# Patient Record
Sex: FEMALE | Race: WHITE | Employment: OTHER | URBAN - NONMETROPOLITAN AREA
[De-identification: names, ages, dates, MRNs, and addresses within clinical notes are randomized per-mention and may not be internally consistent; named-entity substitution may affect disease eponyms.]

---

## 2019-01-10 ENCOUNTER — OFFICE VISIT (OUTPATIENT)
Dept: FAMILY MEDICINE CLINIC | Age: 79
End: 2019-01-10
Payer: MEDICARE

## 2019-01-10 VITALS
BODY MASS INDEX: 20.19 KG/M2 | HEART RATE: 73 BPM | WEIGHT: 114 LBS | DIASTOLIC BLOOD PRESSURE: 80 MMHG | SYSTOLIC BLOOD PRESSURE: 130 MMHG | OXYGEN SATURATION: 94 %

## 2019-01-10 DIAGNOSIS — F41.9 ANXIETY: ICD-10-CM

## 2019-01-10 DIAGNOSIS — R53.83 FATIGUE, UNSPECIFIED TYPE: ICD-10-CM

## 2019-01-10 DIAGNOSIS — K21.9 GASTROESOPHAGEAL REFLUX DISEASE, ESOPHAGITIS PRESENCE NOT SPECIFIED: ICD-10-CM

## 2019-01-10 DIAGNOSIS — E78.5 HYPERLIPIDEMIA, UNSPECIFIED HYPERLIPIDEMIA TYPE: Primary | ICD-10-CM

## 2019-01-10 DIAGNOSIS — G62.9 NEUROPATHY: ICD-10-CM

## 2019-01-10 DIAGNOSIS — E55.9 VITAMIN D DEFICIENCY: ICD-10-CM

## 2019-01-10 PROCEDURE — G8420 CALC BMI NORM PARAMETERS: HCPCS | Performed by: FAMILY MEDICINE

## 2019-01-10 PROCEDURE — G8427 DOCREV CUR MEDS BY ELIG CLIN: HCPCS | Performed by: FAMILY MEDICINE

## 2019-01-10 PROCEDURE — 4040F PNEUMOC VAC/ADMIN/RCVD: CPT | Performed by: FAMILY MEDICINE

## 2019-01-10 PROCEDURE — 1123F ACP DISCUSS/DSCN MKR DOCD: CPT | Performed by: FAMILY MEDICINE

## 2019-01-10 PROCEDURE — 1090F PRES/ABSN URINE INCON ASSESS: CPT | Performed by: FAMILY MEDICINE

## 2019-01-10 PROCEDURE — 99203 OFFICE O/P NEW LOW 30 MIN: CPT | Performed by: FAMILY MEDICINE

## 2019-01-10 PROCEDURE — G8484 FLU IMMUNIZE NO ADMIN: HCPCS | Performed by: FAMILY MEDICINE

## 2019-01-10 PROCEDURE — G8400 PT W/DXA NO RESULTS DOC: HCPCS | Performed by: FAMILY MEDICINE

## 2019-01-10 PROCEDURE — 1036F TOBACCO NON-USER: CPT | Performed by: FAMILY MEDICINE

## 2019-01-10 PROCEDURE — 1101F PT FALLS ASSESS-DOCD LE1/YR: CPT | Performed by: FAMILY MEDICINE

## 2019-01-10 ASSESSMENT — PATIENT HEALTH QUESTIONNAIRE - PHQ9
2. FEELING DOWN, DEPRESSED OR HOPELESS: 0
SUM OF ALL RESPONSES TO PHQ QUESTIONS 1-9: 0
SUM OF ALL RESPONSES TO PHQ QUESTIONS 1-9: 0
SUM OF ALL RESPONSES TO PHQ9 QUESTIONS 1 & 2: 0
1. LITTLE INTEREST OR PLEASURE IN DOING THINGS: 0

## 2019-01-14 LAB
A/G RATIO: 1.5 RATIO
AGE FOR GFR: 79
ALBUMIN: 4.1 G/DL
ALK PHOSPHATASE: 84 UNITS/L
ALT SERPL-CCNC: 14 UNITS/L
ANION GAP SERPL CALCULATED.3IONS-SCNC: 9 MMOL/L
AST SERPL-CCNC: 24 UNITS/L
BASOPHILS # BLD: 0.07 THOU/MM3
BILIRUB SERPL-MCNC: 0.6 MG/DL
BUN BLDV-MCNC: 20 MG/DL
CALCIUM SERPL-MCNC: 9.6 MG/DL
CHLORIDE BLD-SCNC: 102 MMOL/L
CHOLESTEROL/HDL RATIO: 1.8 RATIO
CHOLESTEROL: 149 MG/DL
CO2: 33 MMOL/L
CREAT SERPL-MCNC: 0.6 MG/DL
DIFFERENTIAL: AUTOMATED DIFF
EGFR BF: 117 ML/MIN/1.73 M2
EGFR BM: 158 ML/MIN/1.73 M2
EGFR WF: 96 ML/MIN/1.73 M2
EGFR WM: 130 ML/MIN/1.73 M2
EOSINOPHIL # BLD: 0.09 THOU/MM3
FOLATE: >20 NG/ML
GLOBULIN: 2.8 G/DL
GLUCOSE: 86 MG/DL
HCT VFR BLD CALC: 40.9 %
HDL, DIRECT: 84 MG/DL
HEMOGLOBIN: 13.2 G/DL
LDL CHOLESTEROL CALCULATED: 52.4 MG/DL
LYMPHOCYTES # BLD: 1.6 THOU/MM3
MCH RBC QN AUTO: 28.4 PG
MCHC RBC AUTO-ENTMCNC: 32.2 G/DL
MCV RBC AUTO: 88.2 FL
MONOCYTES # BLD: 0.41 THOU/MM3
NEUTROPHILS: 2.72 THOU/MM3
PDW BLD-RTO: 12 %
PLATELET # BLD: 227 THOU/MM3
PMV BLD AUTO: 6.8 FL
POTASSIUM SERPL-SCNC: 4.1 MMOL/L
RBC # BLD: 4.64 M/UL
SODIUM BLD-SCNC: 140 MMOL/L
TOTAL PROTEIN: 6.9 G/DL
TRIGL SERPL-MCNC: 63 MG/DL
TSH REFLEX FT4: 4.08 MIU/ML
VITAMIN B-12: 490 PG/ML
VITAMIN D2, 25 HYDROXY: 25 NG/ML
VLDLC SERPL CALC-MCNC: 13 MG/DL
WBC # BLD: 4.89 THOU/ML3

## 2019-01-16 ASSESSMENT — ENCOUNTER SYMPTOMS
ABDOMINAL PAIN: 0
SINUS PRESSURE: 0
BACK PAIN: 0
SORE THROAT: 0
COUGH: 0
RHINORRHEA: 0
SINUS PAIN: 0
SHORTNESS OF BREATH: 0
CONSTIPATION: 0
WHEEZING: 0
DIARRHEA: 0
BLOOD IN STOOL: 0

## 2019-02-26 DIAGNOSIS — G62.9 NEUROPATHY: Primary | ICD-10-CM

## 2019-02-26 RX ORDER — TRAMADOL HYDROCHLORIDE 50 MG/1
50 TABLET ORAL 2 TIMES DAILY PRN
Qty: 60 TABLET | Refills: 0 | Status: SHIPPED | OUTPATIENT
Start: 2019-02-26 | End: 2019-03-28

## 2019-03-14 DIAGNOSIS — F41.9 ANXIETY: Primary | ICD-10-CM

## 2019-03-14 RX ORDER — ALPRAZOLAM 0.25 MG/1
TABLET ORAL
Qty: 60 TABLET | Refills: 5 | Status: SHIPPED | OUTPATIENT
Start: 2019-03-14 | End: 2019-09-14

## 2019-04-30 ENCOUNTER — OFFICE VISIT (OUTPATIENT)
Dept: FAMILY MEDICINE CLINIC | Age: 79
End: 2019-04-30
Payer: MEDICARE

## 2019-04-30 VITALS
WEIGHT: 117 LBS | HEART RATE: 72 BPM | BODY MASS INDEX: 20.73 KG/M2 | OXYGEN SATURATION: 97 % | DIASTOLIC BLOOD PRESSURE: 60 MMHG | SYSTOLIC BLOOD PRESSURE: 120 MMHG

## 2019-04-30 DIAGNOSIS — J06.9 VIRAL URI: Primary | ICD-10-CM

## 2019-04-30 DIAGNOSIS — M17.12 OSTEOARTHRITIS OF LEFT KNEE, UNSPECIFIED OSTEOARTHRITIS TYPE: ICD-10-CM

## 2019-04-30 PROCEDURE — 99214 OFFICE O/P EST MOD 30 MIN: CPT | Performed by: FAMILY MEDICINE

## 2019-04-30 PROCEDURE — G8420 CALC BMI NORM PARAMETERS: HCPCS | Performed by: FAMILY MEDICINE

## 2019-04-30 PROCEDURE — G8400 PT W/DXA NO RESULTS DOC: HCPCS | Performed by: FAMILY MEDICINE

## 2019-04-30 PROCEDURE — G8427 DOCREV CUR MEDS BY ELIG CLIN: HCPCS | Performed by: FAMILY MEDICINE

## 2019-04-30 PROCEDURE — 1123F ACP DISCUSS/DSCN MKR DOCD: CPT | Performed by: FAMILY MEDICINE

## 2019-04-30 PROCEDURE — 1036F TOBACCO NON-USER: CPT | Performed by: FAMILY MEDICINE

## 2019-04-30 PROCEDURE — 1090F PRES/ABSN URINE INCON ASSESS: CPT | Performed by: FAMILY MEDICINE

## 2019-04-30 PROCEDURE — 4040F PNEUMOC VAC/ADMIN/RCVD: CPT | Performed by: FAMILY MEDICINE

## 2019-04-30 RX ORDER — FLUTICASONE PROPIONATE 50 MCG
2 SPRAY, SUSPENSION (ML) NASAL DAILY
Qty: 1 BOTTLE | Refills: 0 | Status: SHIPPED | OUTPATIENT
Start: 2019-04-30 | End: 2019-07-11

## 2019-04-30 ASSESSMENT — ENCOUNTER SYMPTOMS
SINUS PRESSURE: 1
SORE THROAT: 0
WHEEZING: 0
RHINORRHEA: 1
BACK PAIN: 0
COUGH: 1
SHORTNESS OF BREATH: 0
SINUS PAIN: 1

## 2019-04-30 NOTE — PROGRESS NOTES
[address] [facility]  [address]  Dept: [phone]  DeptFax: [phone]    Rex Chairez is a65 y.o. female who presents today for her medical conditions/complaints as noted below. Rex Chairez is c/o of Sinusitis (pt c/o congestion, pressure in bridge of nose, coughing up mucous, one day she'll feel good next day she will feel awful) and Leg Swelling (c/o has a spot on left side of knee that swells up and is painful at times)      HPI:     HPI   Patient comes in thinking she has a sinus infection. Started for 5 days ago. She feels congested. He has sinus pressure. Yellow drainage. Coughs up yellow drainage. No fever. No shortness of breath or dyspnea on exertion. No sore throat. No ear pain. Also complaining of left knee pain. She's had for quite some time. Localizes it to the medial compartment. Pain is not there all the time. Sometimes it seems to be swollen. With the pain. No precipitating or alleviating factors. She will use some Tylenol. It hurts to walk when the pain is there. No prior trauma or injury          BP Readings from Last 3 Encounters:   04/30/19 120/60   01/10/19 130/80   08/02/13 130/80            (goal 120/80)    Past Medical History:   Diagnosis Date    Anxiety     Hyperlipidemia     Neuropathy       No past surgical history on file. No family history on file. Social History     Tobacco Use    Smoking status: Never Smoker    Smokeless tobacco: Never Used   Substance Use Topics    Alcohol use: No        Current Outpatient Medications   Medication Sig Dispense Refill    fluticasone (FLONASE) 50 MCG/ACT nasal spray 2 sprays by Each Nare route daily 2 Sprays in each nostril 1 Bottle 0    ALPRAZolam (XANAX) 0.25 MG tablet One or two nightly as needed 60 tablet 5    rosuvastatin (CRESTOR) 10 MG tablet Take 10 mg by mouth daily. No current facility-administered medications for this visit. Allergies   Allergen Reactions    Cetirizine Other (See Comments)    Ranitidine        Health Maintenance   Topic Date Due    Shingles Vaccine (1 of 2) 09/17/1989    DEXA (modify frequency per FRAX score)  09/17/2004    Pneumococcal 65+ years Vaccine (1 of 2 - PCV13) 09/17/2004    DTaP/Tdap/Td vaccine (3 - Td) 06/29/2022    Flu vaccine  Completed       Lab Results   Component Value Date    K 4.1 01/14/2019    CREATININE 0.6 01/14/2019    AST 24 01/14/2019    ALT 14 01/14/2019    HCT 40.9 01/14/2019    GLUCOSE 86 01/14/2019    CALCIUM 9.6 01/14/2019    GYLKZLZB90 490 01/14/2019      Lab Results   Component Value Date    CHOL 149 01/14/2019    TRIG 63 01/14/2019       Subjective:      Review of Systems   Constitutional: Negative for activity change, chills, diaphoresis, fatigue and fever. HENT: Positive for congestion, rhinorrhea, sinus pressure and sinus pain. Negative for ear discharge, ear pain and sore throat. Respiratory: Positive for cough. Negative for shortness of breath and wheezing. Cardiovascular: Negative for chest pain. Musculoskeletal: Positive for arthralgias, gait problem (due to her neuropathy ) and joint swelling. Negative for back pain. Objective:     /60   Pulse 72   Wt 117 lb (53.1 kg)   SpO2 97%   BMI 20.73 kg/m²     Physical Exam   Constitutional: No distress. HENT:   Right Ear: Tympanic membrane normal.   Left Ear: Tympanic membrane normal.   Nose: No mucosal edema or rhinorrhea. Right sinus exhibits no maxillary sinus tenderness and no frontal sinus tenderness. Left sinus exhibits no maxillary sinus tenderness and no frontal sinus tenderness. Mouth/Throat: Oropharynx is clear and moist and mucous membranes are normal. No tonsillar exudate. Cardiovascular: Normal rate, regular rhythm, S1 normal, S2 normal and normal heart sounds. No murmur heard. Pulmonary/Chest: Effort normal. She has no decreased breath sounds. She has no wheezes.  She has no rhonchi. She has no rales. Chest wall is not dull to percussion. Musculoskeletal:        Left knee: She exhibits normal range of motion, no swelling, no effusion, no erythema, normal patellar mobility, no bony tenderness, normal meniscus and no MCL laxity. Tenderness found. Medial joint line and MCL tenderness noted. Assessment:      Diagnosis Orders   1. Viral URI     2. Osteoarthritis of left knee, unspecified osteoarthritis type              POC Testing Results (If Applicable):  No results found for this visit on 19. Plan:     Flonase for nasal drainage. We will observe her left knee. This is most undoubtedly some arthritis. No x-ray. Call if no better in a week with her nasal drainage or if she develops fever. Otherwise she may return the office as scheduled sooner if any problems    Orders Given:  No orders of the defined types were placed in this encounter. Prescriptions:    Orders Placed This Encounter   Medications    fluticasone (FLONASE) 50 MCG/ACT nasal spray     Si sprays by Each Nare route daily 2 Sprays in each nostril     Dispense:  1 Bottle     Refill:  0        Return today (on 2019). Electronically signed by Wayne Velasquez MD on2019. **This report has been created using voice recognition software. It may contain minor errors which are inherent in voice recognition technology. **

## 2019-05-28 RX ORDER — ROSUVASTATIN CALCIUM 10 MG/1
10 TABLET, COATED ORAL DAILY
Qty: 90 TABLET | Refills: 0 | Status: SHIPPED | OUTPATIENT
Start: 2019-05-28 | End: 2019-10-30 | Stop reason: SDUPTHER

## 2019-05-31 ENCOUNTER — TELEPHONE (OUTPATIENT)
Dept: FAMILY MEDICINE CLINIC | Age: 79
End: 2019-05-31

## 2019-06-25 ENCOUNTER — TELEPHONE (OUTPATIENT)
Dept: FAMILY MEDICINE CLINIC | Age: 79
End: 2019-06-25

## 2019-06-25 DIAGNOSIS — Z12.31 ENCOUNTER FOR SCREENING MAMMOGRAM FOR MALIGNANT NEOPLASM OF BREAST: Primary | ICD-10-CM

## 2019-07-06 DIAGNOSIS — Z12.31 ENCOUNTER FOR SCREENING MAMMOGRAM FOR MALIGNANT NEOPLASM OF BREAST: ICD-10-CM

## 2019-07-11 ENCOUNTER — OFFICE VISIT (OUTPATIENT)
Dept: FAMILY MEDICINE CLINIC | Age: 79
End: 2019-07-11
Payer: MEDICARE

## 2019-07-11 VITALS
WEIGHT: 118 LBS | HEIGHT: 63 IN | HEART RATE: 76 BPM | DIASTOLIC BLOOD PRESSURE: 64 MMHG | BODY MASS INDEX: 20.91 KG/M2 | SYSTOLIC BLOOD PRESSURE: 126 MMHG | OXYGEN SATURATION: 95 %

## 2019-07-11 DIAGNOSIS — E78.5 HYPERLIPIDEMIA, UNSPECIFIED HYPERLIPIDEMIA TYPE: ICD-10-CM

## 2019-07-11 DIAGNOSIS — G62.9 NEUROPATHY: ICD-10-CM

## 2019-07-11 DIAGNOSIS — K21.9 GASTROESOPHAGEAL REFLUX DISEASE, ESOPHAGITIS PRESENCE NOT SPECIFIED: ICD-10-CM

## 2019-07-11 DIAGNOSIS — F41.9 ANXIETY: Primary | ICD-10-CM

## 2019-07-11 DIAGNOSIS — E55.9 VITAMIN D DEFICIENCY: ICD-10-CM

## 2019-07-11 PROCEDURE — G8427 DOCREV CUR MEDS BY ELIG CLIN: HCPCS | Performed by: FAMILY MEDICINE

## 2019-07-11 PROCEDURE — 99214 OFFICE O/P EST MOD 30 MIN: CPT | Performed by: FAMILY MEDICINE

## 2019-07-11 PROCEDURE — 4040F PNEUMOC VAC/ADMIN/RCVD: CPT | Performed by: FAMILY MEDICINE

## 2019-07-11 PROCEDURE — G8400 PT W/DXA NO RESULTS DOC: HCPCS | Performed by: FAMILY MEDICINE

## 2019-07-11 PROCEDURE — 1090F PRES/ABSN URINE INCON ASSESS: CPT | Performed by: FAMILY MEDICINE

## 2019-07-11 PROCEDURE — G8420 CALC BMI NORM PARAMETERS: HCPCS | Performed by: FAMILY MEDICINE

## 2019-07-11 PROCEDURE — 1123F ACP DISCUSS/DSCN MKR DOCD: CPT | Performed by: FAMILY MEDICINE

## 2019-07-11 PROCEDURE — 1036F TOBACCO NON-USER: CPT | Performed by: FAMILY MEDICINE

## 2019-07-11 ASSESSMENT — ENCOUNTER SYMPTOMS
ABDOMINAL PAIN: 0
SORE THROAT: 0
COUGH: 0
DIARRHEA: 0
SINUS PRESSURE: 0
SHORTNESS OF BREATH: 0
WHEEZING: 0
BLOOD IN STOOL: 0
SINUS PAIN: 0
BACK PAIN: 0
RHINORRHEA: 0
CONSTIPATION: 0

## 2019-10-30 DIAGNOSIS — E78.5 HYPERLIPIDEMIA, UNSPECIFIED HYPERLIPIDEMIA TYPE: ICD-10-CM

## 2019-10-30 DIAGNOSIS — F41.9 ANXIETY: Primary | ICD-10-CM

## 2019-10-31 RX ORDER — ROSUVASTATIN CALCIUM 10 MG/1
10 TABLET, COATED ORAL DAILY
Qty: 30 TABLET | Refills: 3 | Status: SHIPPED | OUTPATIENT
Start: 2019-10-31 | End: 2020-02-19

## 2019-10-31 RX ORDER — ROSUVASTATIN CALCIUM 10 MG/1
5 TABLET, COATED ORAL DAILY
Qty: 90 TABLET | Refills: 3 | Status: SHIPPED | OUTPATIENT
Start: 2019-10-31 | End: 2020-02-19

## 2019-10-31 RX ORDER — ALPRAZOLAM 0.5 MG/1
TABLET ORAL
Qty: 60 TABLET | Refills: 5 | Status: SHIPPED | OUTPATIENT
Start: 2019-10-31 | End: 2019-11-30

## 2019-10-31 RX ORDER — ALPRAZOLAM 0.5 MG/1
0.5 TABLET ORAL 2 TIMES DAILY PRN
Qty: 60 TABLET | Refills: 5 | Status: SHIPPED | OUTPATIENT
Start: 2019-10-31 | End: 2019-11-30

## 2019-11-07 DIAGNOSIS — F41.9 ANXIETY: Primary | ICD-10-CM

## 2019-11-07 DIAGNOSIS — E78.5 HYPERLIPIDEMIA, UNSPECIFIED HYPERLIPIDEMIA TYPE: ICD-10-CM

## 2019-11-08 RX ORDER — ROSUVASTATIN CALCIUM 5 MG/1
5 TABLET, COATED ORAL DAILY
Qty: 90 TABLET | Refills: 3 | Status: SHIPPED | OUTPATIENT
Start: 2019-11-08 | End: 2020-10-30 | Stop reason: SDUPTHER

## 2019-11-08 RX ORDER — ALPRAZOLAM 0.25 MG/1
0.25 TABLET ORAL NIGHTLY PRN
Qty: 30 TABLET | Refills: 5 | Status: SHIPPED | OUTPATIENT
Start: 2019-11-08 | End: 2019-12-08

## 2020-02-19 ENCOUNTER — OFFICE VISIT (OUTPATIENT)
Dept: FAMILY MEDICINE CLINIC | Age: 80
End: 2020-02-19
Payer: MEDICARE

## 2020-02-19 VITALS
SYSTOLIC BLOOD PRESSURE: 120 MMHG | OXYGEN SATURATION: 99 % | HEART RATE: 66 BPM | DIASTOLIC BLOOD PRESSURE: 70 MMHG | WEIGHT: 118 LBS | BODY MASS INDEX: 20.9 KG/M2

## 2020-02-19 PROBLEM — E55.9 VITAMIN D DEFICIENCY: Status: ACTIVE | Noted: 2020-02-19

## 2020-02-19 PROBLEM — G62.9 NEUROPATHY: Status: ACTIVE | Noted: 2020-02-19

## 2020-02-19 PROBLEM — E78.5 HYPERLIPIDEMIA: Status: ACTIVE | Noted: 2020-02-19

## 2020-02-19 PROBLEM — F41.9 ANXIETY: Status: ACTIVE | Noted: 2020-02-19

## 2020-02-19 PROBLEM — G47.00 INSOMNIA: Status: ACTIVE | Noted: 2020-02-19

## 2020-02-19 PROCEDURE — 99214 OFFICE O/P EST MOD 30 MIN: CPT | Performed by: FAMILY MEDICINE

## 2020-02-19 PROCEDURE — 1090F PRES/ABSN URINE INCON ASSESS: CPT | Performed by: FAMILY MEDICINE

## 2020-02-19 PROCEDURE — 1123F ACP DISCUSS/DSCN MKR DOCD: CPT | Performed by: FAMILY MEDICINE

## 2020-02-19 PROCEDURE — 1036F TOBACCO NON-USER: CPT | Performed by: FAMILY MEDICINE

## 2020-02-19 PROCEDURE — G8400 PT W/DXA NO RESULTS DOC: HCPCS | Performed by: FAMILY MEDICINE

## 2020-02-19 PROCEDURE — G8482 FLU IMMUNIZE ORDER/ADMIN: HCPCS | Performed by: FAMILY MEDICINE

## 2020-02-19 PROCEDURE — G8427 DOCREV CUR MEDS BY ELIG CLIN: HCPCS | Performed by: FAMILY MEDICINE

## 2020-02-19 PROCEDURE — G8420 CALC BMI NORM PARAMETERS: HCPCS | Performed by: FAMILY MEDICINE

## 2020-02-19 PROCEDURE — 4040F PNEUMOC VAC/ADMIN/RCVD: CPT | Performed by: FAMILY MEDICINE

## 2020-02-19 RX ORDER — ACETAMINOPHEN 325 MG/1
TABLET ORAL
Qty: 120 TABLET | Refills: 3
Start: 2020-02-19

## 2020-02-19 RX ORDER — ALPRAZOLAM 0.25 MG/1
0.25 TABLET ORAL 2 TIMES DAILY PRN
COMMUNITY
Start: 2020-01-29 | End: 2020-02-19 | Stop reason: SDUPTHER

## 2020-02-19 RX ORDER — ALPRAZOLAM 0.25 MG/1
0.25 TABLET ORAL 2 TIMES DAILY PRN
Qty: 60 TABLET | Refills: 5 | Status: SHIPPED | OUTPATIENT
Start: 2020-02-19 | End: 2020-06-03 | Stop reason: SDUPTHER

## 2020-02-19 ASSESSMENT — ENCOUNTER SYMPTOMS
SINUS PRESSURE: 0
RHINORRHEA: 0
SINUS PAIN: 0
WHEEZING: 0
BACK PAIN: 0
DIARRHEA: 0
SHORTNESS OF BREATH: 0
COUGH: 0
BLOOD IN STOOL: 0
ABDOMINAL PAIN: 0
SORE THROAT: 0
CONSTIPATION: 0

## 2020-02-19 ASSESSMENT — PATIENT HEALTH QUESTIONNAIRE - PHQ9
SUM OF ALL RESPONSES TO PHQ QUESTIONS 1-9: 0
SUM OF ALL RESPONSES TO PHQ9 QUESTIONS 1 & 2: 0
2. FEELING DOWN, DEPRESSED OR HOPELESS: 0
SUM OF ALL RESPONSES TO PHQ QUESTIONS 1-9: 0
1. LITTLE INTEREST OR PLEASURE IN DOING THINGS: 0

## 2020-02-19 NOTE — PROGRESS NOTES
[address] [place] [id number]  Dept: [phone]  DeptFax: [phone]    Malissa Mcclellan is [de-identified] y.o. female who presents today for her medical conditions/complaints as noted below. Malissa Mcclellan is c/o of 6 Month Follow-Up (c/o right arm pain); Anxiety; and Hyperlipidemia      HPI:     HPI     This is a routine six-month follow-up. Hyperlipidemia  Patient remains on 5 mg of Crestor, one half of a 10 mg tablet. . Tolerates without side effects.   She denies chest pain or chest pressure.  No prior history of a cardiac workup.  She has never needed to see a cardiologist.    Recent lipid panel in January showed to have a total cholesterol of 153 HDL 88 and LDL 52. Normal liver profile  Also glucose was 80. BUN 25 creatinine 0.6. GFR greater than 60. Sodium 140 potassium 4.2     Anxiety  It turns out she takes alprazolam nightly for sleep. Radha Javier has done so for many years. However the patient states that sometimes she takes 2 tablets. Either for anxiety or for sleep or for both good and she is only been receiving 30 tablets at a time. Her OARS report is reviewed. At one time we were giving her 60 tablets. For some reason that was switched.       Neuropathy  One of the most bothersome things for her.  A longtime problem for her. He is stated in the past she has had a comprehensive work-up. To no avail. We did check her B12 folate and TSH that a year ago they were normal.  But her vitamin D level was low at 25. .    At her last visit we started vitamin D 2000 units daily. For vitamin D level 25. Her recent vitamin D level was 33.5 done this months. At home she uses a walker. When she is out she relies on her  for balance. Her gait is very unsteady     Patient also mentions right arm pain. She is been bothered by some pain in the right forearm for some time. From the elbow on down. Along with numbness in her hand.   But now she 01/14/2019      Lab Results   Component Value Date    CHOL 149 01/14/2019    TRIG 63 01/14/2019       Subjective:      Review of Systems   Constitutional: Negative for activity change, fatigue, fever and unexpected weight change. HENT: Negative for congestion, postnasal drip, rhinorrhea, sinus pressure, sinus pain and sore throat. Eyes: Negative for visual disturbance. Respiratory: Negative for cough, shortness of breath and wheezing. Cardiovascular: Negative for chest pain, palpitations and leg swelling. Gastrointestinal: Negative for abdominal pain, blood in stool, constipation and diarrhea. Genitourinary: Negative for dysuria, hematuria and urgency. Musculoskeletal: Positive for arthralgias (right arm and shoulder ) and gait problem. Negative for back pain. Skin: Negative for rash and wound. Neurological: Negative for dizziness, weakness, light-headedness and headaches. Psychiatric/Behavioral: Positive for sleep disturbance. The patient is nervous/anxious. Objective:     /70   Pulse 66   Wt 118 lb (53.5 kg)   SpO2 99%   BMI 20.90 kg/m²     Physical Exam  Constitutional:       Appearance: She is well-developed. She is not ill-appearing. HENT:      Right Ear: Tympanic membrane normal.      Left Ear: Tympanic membrane normal.   Neck:      Musculoskeletal: Neck supple. Vascular: No carotid bruit. Cardiovascular:      Rate and Rhythm: Normal rate and regular rhythm. Pulses: Normal pulses. Heart sounds: Normal heart sounds, S1 normal and S2 normal. No murmur. Pulmonary:      Effort: Pulmonary effort is normal.      Breath sounds: No decreased breath sounds, wheezing, rhonchi or rales. Chest:      Chest wall: No tenderness. There is no dullness to percussion. Abdominal:      General: Bowel sounds are normal. There is no abdominal bruit. Palpations: Abdomen is soft. There is no pulsatile mass. Tenderness: There is no abdominal tenderness. Musculoskeletal:      Right shoulder: She exhibits pain (mild with cross over). She exhibits normal range of motion, no tenderness, no bony tenderness, no effusion and no crepitus. Right elbow: Normal.     Right wrist: She exhibits no tenderness and no bony tenderness. Comments: Negative Tinel's of the right wrist.  Positive Estevan's   Neurological:      Gait: Gait abnormal.      Deep Tendon Reflexes:      Reflex Scores:       Bicep reflexes are 2+ on the right side and 2+ on the left side. Assessment:      Diagnosis Orders   1. Vitamin D deficiency     2. Mixed hyperlipidemia     3. Anxiety  ALPRAZolam (XANAX) 0.25 MG tablet   4. Neuropathy     5. Insomnia, unspecified type  ALPRAZolam (XANAX) 0.25 MG tablet   6. Right shoulder pain, unspecified chronicity  acetaminophen (TYLENOL) 325 MG tablet            POC Testing Results (If Applicable):  No results found for this visit on 02/19/20. Plan: Will continue current medications. Including vitamin D. Her prescription for alprazolam is renewed. At 60 tablets monthly. Somehow the quantity was reduced to 30 tablets monthly instead of the 60 we usually prescribed. After some discussion we decided on conservative management of her shoulder and arm pain. Namely PRN Tylenol. Correct doses are provided  after visit summary. Recheck in 6 months sooner if any problems        Orders Given:  No orders of the defined types were placed in this encounter. Prescriptions:    Orders Placed This Encounter   Medications    ALPRAZolam (XANAX) 0.25 MG tablet     Sig: Take 1 tablet by mouth 2 times daily as needed for Sleep or Anxiety for up to 180 days. Dispense:  60 tablet     Refill:  5    acetaminophen (TYLENOL) 325 MG tablet     Sig: One or two tablets as needed for shoulder/arm pain     Dispense:  120 tablet     Refill:  3        Return in about 6 months (around 8/19/2020).       Electronically signed by Corrina Venegas MD

## 2020-06-05 RX ORDER — ALPRAZOLAM 0.25 MG/1
0.25 TABLET ORAL 2 TIMES DAILY PRN
Qty: 60 TABLET | Refills: 0 | Status: SHIPPED | OUTPATIENT
Start: 2020-06-05 | End: 2020-08-31 | Stop reason: SDUPTHER

## 2020-06-19 ENCOUNTER — OFFICE VISIT (OUTPATIENT)
Dept: FAMILY MEDICINE CLINIC | Age: 80
End: 2020-06-19
Payer: MEDICARE

## 2020-06-19 VITALS
WEIGHT: 118 LBS | DIASTOLIC BLOOD PRESSURE: 74 MMHG | OXYGEN SATURATION: 94 % | HEART RATE: 75 BPM | BODY MASS INDEX: 20.9 KG/M2 | SYSTOLIC BLOOD PRESSURE: 120 MMHG

## 2020-06-19 PROBLEM — Z12.31 SCREENING MAMMOGRAM, ENCOUNTER FOR: Status: ACTIVE | Noted: 2020-06-19

## 2020-06-19 PROCEDURE — 99213 OFFICE O/P EST LOW 20 MIN: CPT | Performed by: NURSE PRACTITIONER

## 2020-06-19 SDOH — ECONOMIC STABILITY: INCOME INSECURITY: HOW HARD IS IT FOR YOU TO PAY FOR THE VERY BASICS LIKE FOOD, HOUSING, MEDICAL CARE, AND HEATING?: PATIENT DECLINED

## 2020-06-19 SDOH — ECONOMIC STABILITY: FOOD INSECURITY: WITHIN THE PAST 12 MONTHS, YOU WORRIED THAT YOUR FOOD WOULD RUN OUT BEFORE YOU GOT MONEY TO BUY MORE.: PATIENT DECLINED

## 2020-06-19 SDOH — ECONOMIC STABILITY: TRANSPORTATION INSECURITY
IN THE PAST 12 MONTHS, HAS THE LACK OF TRANSPORTATION KEPT YOU FROM MEDICAL APPOINTMENTS OR FROM GETTING MEDICATIONS?: PATIENT DECLINED

## 2020-06-19 SDOH — ECONOMIC STABILITY: TRANSPORTATION INSECURITY
IN THE PAST 12 MONTHS, HAS LACK OF TRANSPORTATION KEPT YOU FROM MEETINGS, WORK, OR FROM GETTING THINGS NEEDED FOR DAILY LIVING?: PATIENT DECLINED

## 2020-06-19 SDOH — ECONOMIC STABILITY: FOOD INSECURITY: WITHIN THE PAST 12 MONTHS, THE FOOD YOU BOUGHT JUST DIDN'T LAST AND YOU DIDN'T HAVE MONEY TO GET MORE.: PATIENT DECLINED

## 2020-06-19 ASSESSMENT — ENCOUNTER SYMPTOMS
COUGH: 0
WHEEZING: 0
ABDOMINAL PAIN: 0
SHORTNESS OF BREATH: 0

## 2020-06-19 NOTE — PROGRESS NOTES
 DTaP/Tdap/Td vaccine (3 - Td) 06/29/2022    Flu vaccine  Completed    Hepatitis A vaccine  Aged Out    Hepatitis B vaccine  Aged Out    Hib vaccine  Aged Out    Meningococcal (ACWY) vaccine  Aged Out       Subjective:      Review of Systems   Constitutional: Negative for chills, fatigue and fever. HENT: Negative for congestion. Respiratory: Negative for cough, shortness of breath and wheezing. Cardiovascular: Negative for chest pain, palpitations and leg swelling. Gastrointestinal: Negative for abdominal pain. Neurological: Negative for dizziness. Objective:     /74   Pulse 75   Wt 118 lb (53.5 kg)   SpO2 94%   BMI 20.90 kg/m²     Physical Exam  Vitals signs and nursing note reviewed. Constitutional:       Appearance: Normal appearance. Cardiovascular:      Rate and Rhythm: Normal rate and regular rhythm. Heart sounds: S1 normal and S2 normal.   Pulmonary:      Effort: Pulmonary effort is normal.      Breath sounds: Normal breath sounds. Abdominal:      General: Bowel sounds are normal.      Palpations: Abdomen is soft. Musculoskeletal:      Right lower leg: No edema. Left lower leg: No edema. Skin:     General: Skin is warm. Neurological:      Mental Status: She is alert. Psychiatric:         Attention and Perception: Attention normal.         Behavior: Behavior is cooperative. Judgment: Judgment normal.         Labs Reviewed 6/19/2020:    Lab Results   Component Value Date    WBC 4.89 01/14/2019    HGB 13.2 01/14/2019    HCT 40.9 01/14/2019     01/14/2019    CHOL 149 01/14/2019    TRIG 63 01/14/2019    ALT 14 01/14/2019    AST 24 01/14/2019     01/14/2019    K 4.1 01/14/2019     01/14/2019    CREATININE 0.6 01/14/2019    BUN 20 (H) 01/14/2019    CO2 33 (H) 01/14/2019       Assessment/Plan      1. Mixed hyperlipidemia  Low-fat diet, low-cholesterol.   Stable, controlled, chronic, return in 6 months for recheck and more current

## 2020-06-29 ENCOUNTER — TELEPHONE (OUTPATIENT)
Dept: FAMILY MEDICINE CLINIC | Age: 80
End: 2020-06-29

## 2020-07-19 PROBLEM — Z12.31 SCREENING MAMMOGRAM, ENCOUNTER FOR: Status: RESOLVED | Noted: 2020-06-19 | Resolved: 2020-07-19

## 2020-08-31 RX ORDER — ALPRAZOLAM 0.25 MG/1
0.25 TABLET ORAL 2 TIMES DAILY PRN
Qty: 60 TABLET | Refills: 0 | Status: SHIPPED | OUTPATIENT
Start: 2020-08-31 | End: 2020-10-06 | Stop reason: SDUPTHER

## 2020-08-31 NOTE — TELEPHONE ENCOUNTER
Amy Bentley is calling to request a refill on the following medication(s):  Requested Prescriptions     Pending Prescriptions Disp Refills    ALPRAZolam (XANAX) 0.25 MG tablet 60 tablet 0     Sig: Take 1 tablet by mouth 2 times daily as needed for Sleep or Anxiety for up to 180 days.        Last Visit Date (If Applicable):  1/14/1714    Next Visit Date:    12/21/2020

## 2020-09-15 ENCOUNTER — NURSE ONLY (OUTPATIENT)
Dept: FAMILY MEDICINE CLINIC | Age: 80
End: 2020-09-15
Payer: MEDICARE

## 2020-09-15 PROCEDURE — 90653 IIV ADJUVANT VACCINE IM: CPT

## 2020-10-05 NOTE — TELEPHONE ENCOUNTER
Requested Prescriptions     Pending Prescriptions Disp Refills    ALPRAZolam (XANAX) 0.25 MG tablet 60 tablet 0     Sig: Take 1 tablet by mouth 2 times daily as needed for Sleep or Anxiety for up to 180 days.

## 2020-10-06 RX ORDER — ALPRAZOLAM 0.25 MG/1
0.25 TABLET ORAL 2 TIMES DAILY PRN
Qty: 60 TABLET | Refills: 0 | Status: SHIPPED | OUTPATIENT
Start: 2020-10-06 | End: 2021-04-04

## 2020-10-27 NOTE — TELEPHONE ENCOUNTER
Reuben Lawrence is requesting a refill on the following medication(s):  Requested Prescriptions     Pending Prescriptions Disp Refills    rosuvastatin (CRESTOR) 5 MG tablet 90 tablet 3     Sig: Take 1 tablet by mouth daily       Last Visit Date (If Applicable):  6/45/4308    Next Visit Date:    12/21/2020

## 2020-10-30 RX ORDER — ROSUVASTATIN CALCIUM 5 MG/1
5 TABLET, COATED ORAL DAILY
Qty: 90 TABLET | Refills: 0 | Status: SHIPPED | OUTPATIENT
Start: 2020-10-30

## 2021-08-11 DIAGNOSIS — Z12.31 BREAST CANCER SCREENING BY MAMMOGRAM: Primary | ICD-10-CM
